# Patient Record
Sex: FEMALE | Race: WHITE | HISPANIC OR LATINO
[De-identification: names, ages, dates, MRNs, and addresses within clinical notes are randomized per-mention and may not be internally consistent; named-entity substitution may affect disease eponyms.]

---

## 2024-09-25 PROBLEM — Z00.00 ENCOUNTER FOR PREVENTIVE HEALTH EXAMINATION: Status: ACTIVE | Noted: 2024-09-25

## 2024-09-26 ENCOUNTER — APPOINTMENT (OUTPATIENT)
Dept: PEDIATRIC ORTHOPEDIC SURGERY | Facility: CLINIC | Age: 59
End: 2024-09-26
Payer: COMMERCIAL

## 2024-09-26 VITALS
HEIGHT: 58 IN | DIASTOLIC BLOOD PRESSURE: 75 MMHG | BODY MASS INDEX: 41.98 KG/M2 | TEMPERATURE: 96.4 F | WEIGHT: 200 LBS | SYSTOLIC BLOOD PRESSURE: 130 MMHG

## 2024-09-26 DIAGNOSIS — M54.12 RADICULOPATHY, CERVICAL REGION: ICD-10-CM

## 2024-09-26 DIAGNOSIS — M75.31 CALCIFIC TENDINITIS OF RIGHT SHOULDER: ICD-10-CM

## 2024-09-26 PROCEDURE — 99203 OFFICE O/P NEW LOW 30 MIN: CPT | Mod: 25

## 2024-09-26 PROCEDURE — 20610 DRAIN/INJ JOINT/BURSA W/O US: CPT | Mod: RT

## 2024-09-26 PROCEDURE — 73030 X-RAY EXAM OF SHOULDER: CPT | Mod: RT

## 2024-09-26 PROCEDURE — 72040 X-RAY EXAM NECK SPINE 2-3 VW: CPT

## 2024-09-26 RX ORDER — MELOXICAM 15 MG/1
15 TABLET ORAL
Qty: 30 | Refills: 1 | Status: ACTIVE | COMMUNITY
Start: 2024-09-26 | End: 1900-01-01

## 2024-09-26 NOTE — HISTORY OF PRESENT ILLNESS
[de-identified] : This 58-year-old diabetic right-handed female is seen for evaluation of pain in her right shoulder girdle as well as the base of the neck on the order of 2 months.  No obvious history of trauma precipitating event.  She has noted stiffness and difficulty with motion of the shoulder as well as again pain in the right side of the subaxial region.  No numbness paresthesias motor weakness bladder or bowel dysfunction she has not noted any obvious clicking or popping to the shoulder no sensation of instability no pain does not radiate down to the level of the elbow.  Prior to this no significant complaints.  Current medications she is on metformin as well as insulin.  No allergies.  Past history is otherwise noncontributory other than diabetes

## 2024-09-26 NOTE — PHYSICAL EXAM
[de-identified] : Examination today reveals she is overweight she has painful motion to the cervical spine mild restriction of rotation and tilt to the right spasm and tenderness is noted on the right side of the subaxial region extending into the trapezius no obvious trigger point present.  The right shoulder has an obvious arc of impingement with mild restricted forward flexion abduction in scapular plane as well as restriction of rotation mainly internal reaching behind the back.  She does have tenderness in the subacromial space less so to the greater tuberosity.  Her strength is certainly affected by her degree of pain present.  Neurologically intact.  X-rays ordered and taken today of the cervical spine and right shoulder reveal calcific tendinitis to the shoulder with mild degenerative changes.  The cervical spine reveals multilevel degenerative disc space narrowing C4-C7 with spondylosis facet arthritis spur formation no lesion

## 2024-09-26 NOTE — ASSESSMENT
[FreeTextEntry1] : Impression: Cervical radiculitis, calcific tendinitis right shoulder.  The subacromial space of the shoulder has been injected uneventfully she has been placed on Mobic and referred for formal physical therapy.  She will return in 1 month for reevaluation

## 2024-10-24 ENCOUNTER — APPOINTMENT (OUTPATIENT)
Dept: PEDIATRIC ORTHOPEDIC SURGERY | Facility: CLINIC | Age: 59
End: 2024-10-24
Payer: COMMERCIAL

## 2024-10-24 VITALS
BODY MASS INDEX: 41.98 KG/M2 | WEIGHT: 200 LBS | TEMPERATURE: 96.7 F | HEIGHT: 58 IN | SYSTOLIC BLOOD PRESSURE: 135 MMHG | DIASTOLIC BLOOD PRESSURE: 75 MMHG

## 2024-10-24 DIAGNOSIS — M75.31 CALCIFIC TENDINITIS OF RIGHT SHOULDER: ICD-10-CM

## 2024-10-24 DIAGNOSIS — M54.12 RADICULOPATHY, CERVICAL REGION: ICD-10-CM

## 2024-10-24 PROCEDURE — 20610 DRAIN/INJ JOINT/BURSA W/O US: CPT | Mod: RT

## 2024-10-24 PROCEDURE — 99213 OFFICE O/P EST LOW 20 MIN: CPT | Mod: 25
